# Patient Record
Sex: FEMALE | Race: BLACK OR AFRICAN AMERICAN | NOT HISPANIC OR LATINO | Employment: FULL TIME | URBAN - METROPOLITAN AREA
[De-identification: names, ages, dates, MRNs, and addresses within clinical notes are randomized per-mention and may not be internally consistent; named-entity substitution may affect disease eponyms.]

---

## 2017-10-25 ENCOUNTER — CLINICAL SUPPORT (OUTPATIENT)
Dept: OTHER | Facility: CLINIC | Age: 37
End: 2017-10-25
Payer: COMMERCIAL

## 2017-10-25 DIAGNOSIS — Z00.8 HEALTH EXAMINATION IN POPULATION SURVEYS: Primary | ICD-10-CM

## 2017-10-25 PROCEDURE — 80061 LIPID PANEL: CPT | Mod: QW,S$GLB,, | Performed by: INTERNAL MEDICINE

## 2017-10-25 PROCEDURE — 99401 PREV MED CNSL INDIV APPRX 15: CPT | Mod: S$GLB,,, | Performed by: INTERNAL MEDICINE

## 2017-10-25 PROCEDURE — 82947 ASSAY GLUCOSE BLOOD QUANT: CPT | Mod: QW,S$GLB,, | Performed by: INTERNAL MEDICINE

## 2017-10-26 VITALS
WEIGHT: 139 LBS | HEIGHT: 62 IN | BODY MASS INDEX: 25.58 KG/M2 | SYSTOLIC BLOOD PRESSURE: 140 MMHG | DIASTOLIC BLOOD PRESSURE: 80 MMHG

## 2017-10-26 LAB
GLUCOSE SERPL-MCNC: NORMAL MG/DL (ref 60–140)
POC CHOLESTEROL, HDL: 53 MG/DL (ref 40–?)
POC CHOLESTEROL, LDL: 97 MG/DL (ref ?–160)
POC CHOLESTEROL, TOTAL: 160 MG/DL (ref ?–240)
POC GLUCOSE FASTING: 73 MG/DL (ref 60–110)
POC TOTAL CHOLESTEROL / HDL RATIO: 3 (ref ?–6)
POC TRIGLYCERIDES: 46 MG/DL (ref ?–160)

## 2021-03-05 ENCOUNTER — IMMUNIZATION (OUTPATIENT)
Dept: PRIMARY CARE CLINIC | Facility: CLINIC | Age: 41
End: 2021-03-05

## 2021-03-05 DIAGNOSIS — Z23 NEED FOR VACCINATION: Primary | ICD-10-CM

## 2023-01-10 ENCOUNTER — HOSPITAL ENCOUNTER (OUTPATIENT)
Dept: RADIOLOGY | Facility: HOSPITAL | Age: 43
Discharge: HOME | End: 2023-01-10
Attending: OBSTETRICS & GYNECOLOGY
Payer: COMMERCIAL

## 2023-01-10 DIAGNOSIS — Z12.31 ENCOUNTER FOR SCREENING MAMMOGRAM FOR BREAST CANCER: ICD-10-CM

## 2023-01-10 PROCEDURE — 77063 MAMMO DIGITAL SCREENING BILAT WITH TOMO: ICD-10-PCS | Mod: 26,,, | Performed by: RADIOLOGY

## 2023-01-10 PROCEDURE — 77067 SCR MAMMO BI INCL CAD: CPT | Mod: 26,,, | Performed by: RADIOLOGY

## 2023-01-10 PROCEDURE — 77063 BREAST TOMOSYNTHESIS BI: CPT | Mod: 26,,, | Performed by: RADIOLOGY

## 2023-01-10 PROCEDURE — 77067 MAMMO DIGITAL SCREENING BILAT WITH TOMO: ICD-10-PCS | Mod: 26,,, | Performed by: RADIOLOGY

## 2023-01-10 PROCEDURE — 77067 SCR MAMMO BI INCL CAD: CPT | Mod: TC,PO

## 2023-01-12 ENCOUNTER — HOSPITAL ENCOUNTER (OUTPATIENT)
Dept: RADIOLOGY | Facility: HOSPITAL | Age: 43
Discharge: HOME | End: 2023-01-12
Attending: OBSTETRICS & GYNECOLOGY
Payer: COMMERCIAL

## 2023-01-12 DIAGNOSIS — R92.8 ABNORMAL MAMMOGRAM: ICD-10-CM

## 2023-01-12 PROCEDURE — 76642 ULTRASOUND BREAST LIMITED: CPT | Mod: 26,LT,, | Performed by: RADIOLOGY

## 2023-01-12 PROCEDURE — 76642 ULTRASOUND BREAST LIMITED: CPT | Mod: TC,PO,LT

## 2023-01-12 PROCEDURE — 76642 US BREAST LEFT LIMITED: ICD-10-PCS | Mod: 26,LT,, | Performed by: RADIOLOGY

## 2023-09-27 ENCOUNTER — HOSPITAL ENCOUNTER (OUTPATIENT)
Dept: RADIOLOGY | Facility: HOSPITAL | Age: 43
Discharge: HOME | End: 2023-09-27
Attending: OBSTETRICS & GYNECOLOGY
Payer: COMMERCIAL

## 2023-09-27 DIAGNOSIS — N64.59 ABNORMAL BREAST FINDING: ICD-10-CM

## 2023-09-27 PROCEDURE — 76642 ULTRASOUND BREAST LIMITED: CPT | Mod: TC,PN,LT

## 2023-09-27 PROCEDURE — 76642 US BREAST LEFT LIMITED: ICD-10-PCS | Mod: 26,LT,, | Performed by: RADIOLOGY

## 2023-09-27 PROCEDURE — 76642 ULTRASOUND BREAST LIMITED: CPT | Mod: 26,LT,, | Performed by: RADIOLOGY

## 2025-05-04 ENCOUNTER — HOSPITAL ENCOUNTER (EMERGENCY)
Facility: HOSPITAL | Age: 45
Discharge: HOME | End: 2025-05-04
Attending: FAMILY MEDICINE
Payer: COMMERCIAL

## 2025-05-04 VITALS
DIASTOLIC BLOOD PRESSURE: 97 MMHG | HEIGHT: 62 IN | RESPIRATION RATE: 19 BRPM | TEMPERATURE: 98 F | BODY MASS INDEX: 28.52 KG/M2 | SYSTOLIC BLOOD PRESSURE: 152 MMHG | WEIGHT: 155 LBS | OXYGEN SATURATION: 98 % | HEART RATE: 85 BPM

## 2025-05-04 DIAGNOSIS — R05.9 COUGH: ICD-10-CM

## 2025-05-04 DIAGNOSIS — R05.3 PERSISTENT COUGH: Primary | ICD-10-CM

## 2025-05-04 PROCEDURE — 99283 EMERGENCY DEPT VISIT LOW MDM: CPT | Mod: 25,ER

## 2025-05-04 RX ORDER — BENZONATATE 100 MG/1
100 CAPSULE ORAL 3 TIMES DAILY PRN
Qty: 20 CAPSULE | Refills: 0 | Status: SHIPPED | OUTPATIENT
Start: 2025-05-04 | End: 2025-05-14

## 2025-05-04 NOTE — DISCHARGE INSTRUCTIONS
Your x-ray does not show any fluid or signs of infection.  With you having episodes that have occurred like this in the past, I think it would be beneficial for you to see a lung doctor.  I have placed a referral.  They should call you in a couple of days.  Thank you for allowing us to take care of you today.

## 2025-05-04 NOTE — Clinical Note
"Ernesto"Toney Santacruz was seen and treated in our emergency department on 5/4/2025.  She may return to work on 05/07/2025.       If you have any questions or concerns, please don't hesitate to call.      Yary Tellez PA-C"

## 2025-05-04 NOTE — ED PROVIDER NOTES
Encounter Date: 5/4/2025       History     Chief Complaint   Patient presents with    Cough     Cough since easter. Pt reports seen doctor on Thursday and given steroid and abx. Pt reports SOB and emesis with cough that started 1 week ago     Patient is a 45-year-old female who presents to the emergency department with persistent cough.  Patient reports back in October she got a cough that lasted for ever.  Reports it had resolved and then started back again this year.  Reports it got really bad around Easter.  Reports she saw her primary care doctor who gave her antibiotics and inhaled steroids.  Reports over the last 3 days the cough has worsened.  Reports she is having posttussive vomiting.  Denies chest pain.    The history is provided by the patient.     Review of patient's allergies indicates:  No Known Allergies  History reviewed. No pertinent past medical history.  History reviewed. No pertinent surgical history.  No family history on file.  Social History[1]  Review of Systems   Constitutional:  Negative for activity change, appetite change, chills, fatigue and fever.   HENT:  Negative for congestion, ear discharge, ear pain, postnasal drip, rhinorrhea, sore throat and trouble swallowing.    Respiratory:  Positive for cough.    Cardiovascular:  Negative for chest pain and leg swelling.   Gastrointestinal:  Positive for vomiting (posttussive). Negative for abdominal pain, blood in stool, constipation, diarrhea and nausea.   Genitourinary:  Negative for dysuria.   Musculoskeletal:  Negative for back pain.   Skin:  Negative for rash and wound.   Neurological:  Negative for dizziness, light-headedness and headaches.       Physical Exam     Initial Vitals [05/04/25 1404]   BP Pulse Resp Temp SpO2   (!) 152/97 85 19 98.1 °F (36.7 °C) 98 %      MAP       --         Physical Exam    Nursing note and vitals reviewed.  Constitutional: She appears well-developed and well-nourished. She is not diaphoretic.  Non-toxic  appearance. No distress.   HENT:   Head: Normocephalic.   Right Ear: External ear normal.   Left Ear: External ear normal.   Nose: Nose normal. Mouth/Throat: Oropharynx is clear and moist. No oropharyngeal exudate.   Eyes: Conjunctivae are normal. Pupils are equal, round, and reactive to light.   Neck:   Normal range of motion.  Cardiovascular:  Normal rate and regular rhythm.           No lower extremity edema   Pulmonary/Chest: Breath sounds normal.   Abdominal: Abdomen is soft. Bowel sounds are normal. There is no abdominal tenderness.   Musculoskeletal:         General: Normal range of motion.      Cervical back: Normal range of motion.     Neurological: She is alert and oriented to person, place, and time.   Skin: Skin is warm and dry. Capillary refill takes less than 2 seconds.   Psychiatric: She has a normal mood and affect.         ED Course   Procedures  Labs Reviewed - No data to display       Imaging Results              X-Ray Chest PA And Lateral (Final result)  Result time 05/04/25 15:01:41      Final result by Esteban Power MD (05/04/25 15:01:41)                   Impression:     Negative two-view chest x-ray.    Finalized on: 5/4/2025 3:01 PM By:  Esteban Power MD  [facility]# [id number]      2025-05-04 15:03:[id number]     [facility]               Narrative:    EXAM: XR CHEST PA AND LATERAL    CLINICAL HISTORY:  Cough    TECHNIQUE: 2 views of the chest.    COMPARISON: None    FINDINGS:  The heart size is normal. The lung fields are clear. No acute process is identified.                                         Medications - No data to display  Medical Decision Making  Urgent evaluation of a 45-year-old female who presents to the emergency department with persistent cough.  Patient is afebrile, nontoxic-appearing, and hemodynamically stable.  Satting at 98% on room air.  On lung auscultation, there is no rhonchi, rales, wheezing.  Patient is in no respiratory distress.  No lower extremity edema.  Chest  x-ray reveals no acute cardiopulmonary process.  Patient was prescribed steroids and antibiotics by her PCP this past week.  Will place pulmonology referral.  Advised to return to the emergency department with any worsening symptoms or concerns.    Amount and/or Complexity of Data Reviewed  Radiology: ordered.    Risk  Prescription drug management.                                      Clinical Impression:  Final diagnoses:  [R05.9] Cough                     [1]   Social History  Tobacco Use    Smoking status: Never    Smokeless tobacco: Never   Substance Use Topics    Alcohol use: Yes     Comment: occ    Drug use: Never        Yary Tellez PA-C  05/04/25 1547

## 2025-05-05 ENCOUNTER — TELEPHONE (OUTPATIENT)
Dept: PULMONOLOGY | Facility: CLINIC | Age: 45
End: 2025-05-05
Payer: COMMERCIAL

## 2025-05-05 DIAGNOSIS — R05.9 COUGH, UNSPECIFIED TYPE: Primary | ICD-10-CM

## 2025-05-30 ENCOUNTER — TELEPHONE (OUTPATIENT)
Dept: PULMONOLOGY | Facility: CLINIC | Age: 45
End: 2025-05-30
Payer: COMMERCIAL

## 2025-06-06 ENCOUNTER — HOSPITAL ENCOUNTER (OUTPATIENT)
Dept: PULMONOLOGY | Facility: CLINIC | Age: 45
Discharge: HOME | End: 2025-06-06
Payer: COMMERCIAL

## 2025-06-06 ENCOUNTER — OFFICE VISIT (OUTPATIENT)
Dept: PULMONOLOGY | Facility: CLINIC | Age: 45
End: 2025-06-06
Payer: COMMERCIAL

## 2025-06-06 VITALS
WEIGHT: 156 LBS | BODY MASS INDEX: 28.71 KG/M2 | HEART RATE: 68 BPM | OXYGEN SATURATION: 100 % | HEIGHT: 62 IN | DIASTOLIC BLOOD PRESSURE: 90 MMHG | SYSTOLIC BLOOD PRESSURE: 143 MMHG

## 2025-06-06 DIAGNOSIS — J45.991 COUGH VARIANT ASTHMA: Primary | ICD-10-CM

## 2025-06-06 DIAGNOSIS — R05.9 COUGH, UNSPECIFIED TYPE: ICD-10-CM

## 2025-06-06 DIAGNOSIS — R05.3 PERSISTENT COUGH: ICD-10-CM

## 2025-06-06 LAB
DLCO SINGLE BREATH LLN: 17.82
DLCO SINGLE BREATH PRE REF: 85.7 %
DLCO SINGLE BREATH REF: 23.55
DLCOC SBVA LLN: 3.45
DLCOC SBVA REF: 5.12
DLCOC SINGLE BREATH LLN: 17.82
DLCOC SINGLE BREATH REF: 23.55
DLCOCSBVAULN: 6.78
DLCOCSINGLEBREATHULN: 29.29
DLCOSINGLEBREATHULN: 29.29
DLCOSINGLEBREATHZSCORE: -0.96
DLCOVA LLN: 3.45
DLCOVA PRE REF: 95 %
DLCOVA PRE: 4.86 ML/(MIN*MMHG*L) (ref 3.45–6.78)
DLCOVA REF: 5.12
DLCOVAULN: 6.78
ERVN2 LLN: -16449.01
ERVN2 PRE REF: 142.2 %
ERVN2 PRE: 1.4 L (ref -16449.01–16450.99)
ERVN2 REF: 0.99
ERVN2ULN: ABNORMAL
FEF 25 75 LLN: 1.93
FEF 25 75 PRE REF: 108.1 %
FEF 25 75 REF: 3.32
FEV05 LLN: 1.09
FEV05 REF: 1.94
FEV1 FVC LLN: 71
FEV1 FVC PRE REF: 105 %
FEV1 FVC REF: 82
FEV1 LLN: 1.76
FEV1 PRE REF: 125.6 %
FEV1 REF: 2.3
FRCN2 LLN: 1.75
FRCN2 PRE REF: 93.1 %
FRCN2 REF: 2.57
FRCN2ULN: 3.4
FVC LLN: 2.18
FVC PRE REF: 119.2 %
FVC REF: 2.82
ICN2REF: 1.97
IVC PRE: 3.34 L (ref 2.18–3.5)
IVC SINGLE BREATH LLN: 2.18
IVC SINGLE BREATH PRE REF: 118.3 %
IVC SINGLE BREATH REF: 2.82
IVCSINGLEBREATHULN: 3.5
LLN IC N2: -16448.03
PEF LLN: 4.17
PEF PRE REF: 129.5 %
PEF REF: 6.06
PHYSICIAN COMMENT: ABNORMAL
PRE DLCO: 20.2 ML/(MIN*MMHG) (ref 17.82–29.29)
PRE FEF 25 75: 3.59 L/S (ref 1.93–4.72)
PRE FET 100: 7.2 SEC
PRE FEV05 REF: 122.3 %
PRE FEV1 FVC: 85.93 % (ref 71.08–90.82)
PRE FEV1: 2.89 L (ref 1.76–2.83)
PRE FEV5: 2.38 L (ref 1.09–2.8)
PRE FRC N2: 2.4 L (ref 1.75–3.4)
PRE FVC: 3.37 L (ref 2.18–3.5)
PRE IC N2: 1.99 L (ref -16448.03–16451.97)
PRE PEF: 7.84 L/S (ref 4.17–7.94)
PRE REF IC N2: 101 %
RVN2 LLN: 1.01
RVN2 PRE REF: 62.5 %
RVN2 PRE: 0.99 L (ref 1.01–2.16)
RVN2 REF: 1.59
RVN2TLCN2 LLN: 25.01
RVN2TLCN2 PRE REF: 65.4 %
RVN2TLCN2 PRE: 22.62 % (ref 25.01–44.19)
RVN2TLCN2 REF: 34.6
RVN2TLCN2ULN: 44.19
RVN2ULN: 2.16
TLCN2 LLN: 3.62
TLCN2 PRE REF: 95.2 %
TLCN2 PRE: 4.38 L (ref 3.62–5.59)
TLCN2 REF: 4.6
TLCN2ULN: 5.59
ULN IC N2: ABNORMAL
VA PRE: 4.15 L (ref 4.45–4.45)
VA SINGLE BREATH LLN: 4.45
VA SINGLE BREATH PRE REF: 93.3 %
VA SINGLE BREATH REF: 4.45
VASINGLEBREATHULN: 4.45
VCMAXN2 LLN: 2.18
VCMAXN2 PRE REF: 120.1 %
VCMAXN2 PRE: 3.39 L (ref 2.18–3.5)
VCMAXN2 REF: 2.82
VCMAXN2ULN: 3.5

## 2025-06-06 PROCEDURE — 99999 PR PBB SHADOW E&M-EST. PATIENT-LVL III: CPT | Mod: PBBFAC,,, | Performed by: INTERNAL MEDICINE

## 2025-06-06 RX ORDER — ALBUTEROL SULFATE 90 UG/1
2 INHALANT RESPIRATORY (INHALATION) EVERY 6 HOURS PRN
Qty: 18 G | Refills: 2 | Status: SHIPPED | OUTPATIENT
Start: 2025-06-06 | End: 2026-06-06